# Patient Record
Sex: FEMALE | Race: ASIAN | NOT HISPANIC OR LATINO | URBAN - METROPOLITAN AREA
[De-identification: names, ages, dates, MRNs, and addresses within clinical notes are randomized per-mention and may not be internally consistent; named-entity substitution may affect disease eponyms.]

---

## 2018-05-01 ENCOUNTER — OUTPATIENT (OUTPATIENT)
Dept: OUTPATIENT SERVICES | Facility: HOSPITAL | Age: 23
LOS: 1 days | End: 2018-05-01
Payer: MEDICAID

## 2018-05-01 PROCEDURE — G9001: CPT

## 2018-05-22 ENCOUNTER — EMERGENCY (EMERGENCY)
Facility: HOSPITAL | Age: 23
LOS: 1 days | Discharge: HOME | End: 2018-05-22
Attending: EMERGENCY MEDICINE
Payer: MEDICAID

## 2018-05-22 VITALS
RESPIRATION RATE: 16 BRPM | SYSTOLIC BLOOD PRESSURE: 130 MMHG | TEMPERATURE: 98 F | DIASTOLIC BLOOD PRESSURE: 78 MMHG | WEIGHT: 139.99 LBS | OXYGEN SATURATION: 100 % | HEART RATE: 79 BPM | HEIGHT: 62 IN

## 2018-05-22 LAB
APPEARANCE UR: CLEAR — SIGNIFICANT CHANGE UP
BILIRUB UR-MCNC: NEGATIVE — SIGNIFICANT CHANGE UP
COLOR SPEC: SIGNIFICANT CHANGE UP
DIFF PNL FLD: NEGATIVE — SIGNIFICANT CHANGE UP
GLUCOSE UR QL: NEGATIVE — SIGNIFICANT CHANGE UP
HCG UR QL: NEGATIVE — SIGNIFICANT CHANGE UP
KETONES UR-MCNC: NEGATIVE — SIGNIFICANT CHANGE UP
LEUKOCYTE ESTERASE UR-ACNC: NEGATIVE — SIGNIFICANT CHANGE UP
NITRITE UR-MCNC: NEGATIVE — SIGNIFICANT CHANGE UP
PH UR: 6 — SIGNIFICANT CHANGE UP (ref 5–8)
PROT UR-MCNC: NEGATIVE — SIGNIFICANT CHANGE UP
SP GR SPEC: 1.01 — SIGNIFICANT CHANGE UP (ref 1.01–1.02)
UROBILINOGEN FLD QL: 1

## 2018-05-22 PROCEDURE — 99283 EMERGENCY DEPT VISIT LOW MDM: CPT

## 2018-05-22 PROCEDURE — 81025 URINE PREGNANCY TEST: CPT

## 2018-05-22 PROCEDURE — 81003 URINALYSIS AUTO W/O SCOPE: CPT

## 2018-05-22 RX ORDER — IBUPROFEN 200 MG
600 TABLET ORAL ONCE
Qty: 0 | Refills: 0 | Status: COMPLETED | OUTPATIENT
Start: 2018-05-22 | End: 2018-05-22

## 2018-05-22 RX ORDER — ONDANSETRON 8 MG/1
1 TABLET, FILM COATED ORAL
Qty: 21 | Refills: 0
Start: 2018-05-22

## 2018-05-22 RX ORDER — ONDANSETRON 8 MG/1
4 TABLET, FILM COATED ORAL ONCE
Qty: 0 | Refills: 0 | Status: COMPLETED | OUTPATIENT
Start: 2018-05-22 | End: 2018-05-22

## 2018-05-22 RX ADMIN — Medication 600 MILLIGRAM(S): at 12:44

## 2018-05-22 RX ADMIN — ONDANSETRON 4 MILLIGRAM(S): 8 TABLET, FILM COATED ORAL at 13:10

## 2018-05-22 RX ADMIN — Medication 600 MILLIGRAM(S): at 13:10

## 2018-05-22 NOTE — ED PROVIDER NOTE - PHYSICAL EXAMINATION
Afebrile, hemodynamically stable  NAD, well appearing  Head NCAT  EOMI grossly, anicteric  MMM  RRR, nml S1/S2, no m/r/g  Lungs CTAB, no w/r/r  Abd soft, NT, ND, nml BS, no rebound or guarding  AAO, CN's 3-12 grossly intact  ELISE spontaneously, no leg cyanosis or edema. No CVA tenderness.   Skin warm, well perfused, no rashes or hives

## 2018-05-22 NOTE — ED PROVIDER NOTE - OBJECTIVE STATEMENT
21 y/o F pt with no significant PMHx and no significant PSHx presents to the ED c/o missed period with abd cramping and nausea. Pt relates she has a Carri IUD in place since January 2018. Pt reports an episode of vomiting x5 days ago in the setting of breaking her fast; pt has not been fasting since then. Pt thought she may be pregnant, and went to urgent care, where she was tested negative for pregnancy. Pt states her cramping is located to her lower abd and is similar to her normal period cramps. Pt notes some vaginal discharge that is similar to her "period discharge". Pt took Tylenol for her pain x2 days ago and has not taken anything since. Pt admits she is currently sexually active with her . Pt states she feels tired all the time and came to the ED to find out what is going on as she is unable to go to work 2/2 her symptoms. Pt denies fever, chills, dysuria, vaginal bleeding, diarrhea or any other complaints. NKDA. LBM: today (normal). 21 y/o F pt with no significant PMHx and no significant PSHx presents to the ED c/o missed period with abd cramping and nausea. Pt reports an episode of vomiting x5 days ago in the setting of breaking her fast; pt has not been fasting since then and has tolerated PO well since then. Pt thought she may be pregnant, and went to urgent care, where she was tested negative for pregnancy. Pt states her cramping is located to her b/l lower abd and radiates around to the back and is identical to her usual period cramps. Pt notes some vaginal discharge that is similar to her usual "period discharge." Pt took Tylenol for her pain x2 days ago and has not taken anything since. Pt relates she has a Carri IUD in place since January 2018. Pt admits she is currently sexually active with her . Pt states she feels tired all the time. Pt denies fever, chills, dysuria, vaginal bleeding, diarrhea or any other complaints. NKDA. LBM: today (normal).

## 2018-05-22 NOTE — ED PROVIDER NOTE - MEDICAL DECISION MAKING DETAILS
23 y/o F pt presents with  missed period with pain identical to her usual periods. Character of the pain is low suspicion for torsion. Abd benign and afebrile and character low suspicion for appendicitis or diverticulitis. Pt well appearing, hemodynamically stable. 23 y/o F pt presents with missed period with pain identical to her usual periods. Character of the pain is low suspicion for torsion. Abd benign and afebrile and character low suspicion for appendicitis or diverticulitis. Pt well appearing, hemodynamically stable. 23 y/o F pt presents with missed period with pain identical to her usual periods. Character of the pain is low suspicion for torsion. Abd benign and afebrile and character low suspicion for appendicitis or diverticulitis. Pt well appearing, hemodynamically stable. Given ibuprofen and Zofran. Discharged with need for OB f/u, which she has available, and return precautions.

## 2018-05-24 DIAGNOSIS — R69 ILLNESS, UNSPECIFIED: ICD-10-CM

## 2023-10-10 ENCOUNTER — ASOB RESULT (OUTPATIENT)
Age: 28
End: 2023-10-10

## 2023-10-10 ENCOUNTER — APPOINTMENT (OUTPATIENT)
Dept: ANTEPARTUM | Facility: CLINIC | Age: 28
End: 2023-10-10
Payer: COMMERCIAL

## 2023-10-10 ENCOUNTER — NON-APPOINTMENT (OUTPATIENT)
Age: 28
End: 2023-10-10

## 2023-10-10 PROBLEM — Z00.00 ENCOUNTER FOR PREVENTIVE HEALTH EXAMINATION: Status: ACTIVE | Noted: 2023-10-10

## 2023-10-10 PROCEDURE — 76813 OB US NUCHAL MEAS 1 GEST: CPT | Mod: 59

## 2023-10-10 PROCEDURE — 76801 OB US < 14 WKS SINGLE FETUS: CPT

## 2023-12-05 ENCOUNTER — ASOB RESULT (OUTPATIENT)
Age: 28
End: 2023-12-05

## 2023-12-05 ENCOUNTER — APPOINTMENT (OUTPATIENT)
Dept: ANTEPARTUM | Facility: CLINIC | Age: 28
End: 2023-12-05
Payer: COMMERCIAL

## 2023-12-05 PROCEDURE — 76805 OB US >/= 14 WKS SNGL FETUS: CPT

## 2024-04-19 ENCOUNTER — APPOINTMENT (OUTPATIENT)
Dept: ANTEPARTUM | Facility: CLINIC | Age: 29
End: 2024-04-19

## 2024-04-19 ENCOUNTER — OUTPATIENT (OUTPATIENT)
Dept: INPATIENT UNIT | Facility: HOSPITAL | Age: 29
LOS: 1 days | Discharge: HOME | End: 2024-04-19

## 2024-04-19 VITALS
DIASTOLIC BLOOD PRESSURE: 71 MMHG | SYSTOLIC BLOOD PRESSURE: 110 MMHG | TEMPERATURE: 98 F | HEART RATE: 83 BPM | RESPIRATION RATE: 16 BRPM

## 2024-04-19 VITALS — HEART RATE: 81 BPM | DIASTOLIC BLOOD PRESSURE: 74 MMHG | SYSTOLIC BLOOD PRESSURE: 109 MMHG

## 2024-04-19 DIAGNOSIS — O26.899 OTHER SPECIFIED PREGNANCY RELATED CONDITIONS, UNSPECIFIED TRIMESTER: ICD-10-CM

## 2024-04-19 NOTE — OB PROVIDER TRIAGE NOTE - HISTORY OF PRESENT ILLNESS
29yo  @40w2d presents for rule out labor.   +FM. -VB. -LOF.   EFW: 3700g  GBS: neg    OBHx:   GynHx: Denies hx of fibroids/STDs/abnormal pap smears/ovarian cysts/PCOS   PMHx: Denies  SHx: Denies  Meds: PNVs  All: NKDA

## 2024-04-19 NOTE — OB PROVIDER TRIAGE NOTE - ADDITIONAL INSTRUCTIONS
Call your OBGYN w/ any vaginal bleeding, contractions, leakage of fluid.  Call your OBGYN w/ any decreased fetal movement.

## 2024-04-19 NOTE — OB RN TRIAGE NOTE - FALL HARM RISK - UNIVERSAL INTERVENTIONS
Bed in lowest position, wheels locked, appropriate side rails in place/Call bell, personal items and telephone in reach/Instruct patient to call for assistance before getting out of bed or chair/Non-slip footwear when patient is out of bed/[place] to call system/Physically safe environment - no spills, clutter or unnecessary equipment/Purposeful Proactive Rounding/Room/bathroom lighting operational, light cord in reach

## 2024-04-19 NOTE — OB PROVIDER TRIAGE NOTE - NSOBPROVIDERNOTE_OBGYN_ALL_OB_FT
29yo  @40w2d presents for rule out labor.   - No acute OB intervention at this time  - Pt not in labor, VE with minimal change over 2 hours  - Pt counseled on admission for pain control with option for labor augmentation  - Pt declines at this time, desires expectant mgmt  - NST reviewed, reactive  - No medical contraindication to discharge  - Return precautions reviewed   - Call your OBGYN w/ any vaginal bleeding, contractions, leakage of fluid.  Call your OBGYN w/ any decreased fetal movement.     D/W Dr. Curtis Ibarra, PGY-4

## 2024-04-20 ENCOUNTER — TRANSCRIPTION ENCOUNTER (OUTPATIENT)
Age: 29
End: 2024-04-20

## 2024-04-20 ENCOUNTER — INPATIENT (INPATIENT)
Facility: HOSPITAL | Age: 29
LOS: 3 days | Discharge: HOME | End: 2024-04-24
Attending: OBSTETRICS & GYNECOLOGY | Admitting: OBSTETRICS & GYNECOLOGY

## 2024-04-20 ENCOUNTER — APPOINTMENT (OUTPATIENT)
Dept: ANTEPARTUM | Facility: CLINIC | Age: 29
End: 2024-04-20

## 2024-04-20 VITALS
DIASTOLIC BLOOD PRESSURE: 70 MMHG | TEMPERATURE: 98 F | SYSTOLIC BLOOD PRESSURE: 110 MMHG | HEART RATE: 88 BPM | RESPIRATION RATE: 16 BRPM

## 2024-04-20 DIAGNOSIS — O26.899 OTHER SPECIFIED PREGNANCY RELATED CONDITIONS, UNSPECIFIED TRIMESTER: ICD-10-CM

## 2024-04-20 LAB
BASOPHILS # BLD AUTO: 0.06 K/UL — SIGNIFICANT CHANGE UP (ref 0–0.2)
BASOPHILS NFR BLD AUTO: 0.4 % — SIGNIFICANT CHANGE UP (ref 0–2)
BLD GP AB SCN SERPL QL: NEGATIVE — SIGNIFICANT CHANGE UP
EOSINOPHIL # BLD AUTO: 0.02 K/UL — SIGNIFICANT CHANGE UP (ref 0–0.5)
EOSINOPHIL NFR BLD AUTO: 0.1 % — SIGNIFICANT CHANGE UP (ref 0–6)
HCT VFR BLD CALC: 39.3 % — SIGNIFICANT CHANGE UP (ref 34.5–45)
HGB BLD-MCNC: 13.1 G/DL — SIGNIFICANT CHANGE UP (ref 11.5–15.5)
IANC: 11.71 K/UL — HIGH (ref 1.8–7.4)
IMM GRANULOCYTES NFR BLD AUTO: 0.9 % — SIGNIFICANT CHANGE UP (ref 0–0.9)
LYMPHOCYTES # BLD AUTO: 1.82 K/UL — SIGNIFICANT CHANGE UP (ref 1–3.3)
LYMPHOCYTES # BLD AUTO: 12.4 % — LOW (ref 13–44)
MCHC RBC-ENTMCNC: 31.2 PG — SIGNIFICANT CHANGE UP (ref 27–34)
MCHC RBC-ENTMCNC: 33.3 GM/DL — SIGNIFICANT CHANGE UP (ref 32–36)
MCV RBC AUTO: 93.6 FL — SIGNIFICANT CHANGE UP (ref 80–100)
MONOCYTES # BLD AUTO: 0.96 K/UL — HIGH (ref 0–0.9)
MONOCYTES NFR BLD AUTO: 6.5 % — SIGNIFICANT CHANGE UP (ref 2–14)
NEUTROPHILS # BLD AUTO: 11.71 K/UL — HIGH (ref 1.8–7.4)
NEUTROPHILS NFR BLD AUTO: 79.7 % — HIGH (ref 43–77)
NRBC # BLD: 0 /100 WBCS — SIGNIFICANT CHANGE UP (ref 0–0)
NRBC # FLD: 0 K/UL — SIGNIFICANT CHANGE UP (ref 0–0)
PLATELET # BLD AUTO: 242 K/UL — SIGNIFICANT CHANGE UP (ref 150–400)
RBC # BLD: 4.2 M/UL — SIGNIFICANT CHANGE UP (ref 3.8–5.2)
RBC # FLD: 13.7 % — SIGNIFICANT CHANGE UP (ref 10.3–14.5)
RH IG SCN BLD-IMP: POSITIVE — SIGNIFICANT CHANGE UP
RH IG SCN BLD-IMP: POSITIVE — SIGNIFICANT CHANGE UP
WBC # BLD: 14.7 K/UL — HIGH (ref 3.8–10.5)
WBC # FLD AUTO: 14.7 K/UL — HIGH (ref 3.8–10.5)

## 2024-04-20 RX ORDER — SODIUM CHLORIDE 9 MG/ML
1000 INJECTION, SOLUTION INTRAVENOUS ONCE
Refills: 0 | Status: DISCONTINUED | OUTPATIENT
Start: 2024-04-20 | End: 2024-04-21

## 2024-04-20 RX ORDER — CHLORHEXIDINE GLUCONATE 213 G/1000ML
1 SOLUTION TOPICAL DAILY
Refills: 0 | Status: DISCONTINUED | OUTPATIENT
Start: 2024-04-20 | End: 2024-04-21

## 2024-04-20 RX ORDER — ACETAMINOPHEN 500 MG
975 TABLET ORAL ONCE
Refills: 0 | Status: COMPLETED | OUTPATIENT
Start: 2024-04-20 | End: 2024-04-20

## 2024-04-20 RX ORDER — OXYTOCIN 10 UNIT/ML
333.33 VIAL (ML) INJECTION
Qty: 20 | Refills: 0 | Status: DISCONTINUED | OUTPATIENT
Start: 2024-04-20 | End: 2024-04-22

## 2024-04-20 RX ORDER — OXYTOCIN 10 UNIT/ML
VIAL (ML) INJECTION
Qty: 30 | Refills: 0 | Status: DISCONTINUED | OUTPATIENT
Start: 2024-04-20 | End: 2024-04-21

## 2024-04-20 RX ORDER — INFLUENZA VIRUS VACCINE 15; 15; 15; 15 UG/.5ML; UG/.5ML; UG/.5ML; UG/.5ML
0.5 SUSPENSION INTRAMUSCULAR ONCE
Refills: 0 | Status: DISCONTINUED | OUTPATIENT
Start: 2024-04-20 | End: 2024-04-24

## 2024-04-20 RX ORDER — SODIUM CHLORIDE 9 MG/ML
1000 INJECTION, SOLUTION INTRAVENOUS
Refills: 0 | Status: DISCONTINUED | OUTPATIENT
Start: 2024-04-20 | End: 2024-04-21

## 2024-04-20 RX ADMIN — Medication 975 MILLIGRAM(S): at 21:02

## 2024-04-20 RX ADMIN — Medication 2 MILLIUNIT(S)/MIN: at 19:47

## 2024-04-20 RX ADMIN — Medication 975 MILLIGRAM(S): at 21:22

## 2024-04-20 NOTE — OB PROVIDER TRIAGE NOTE - NSHPPHYSICALEXAM_GEN_ALL_CORE
T(C): 36.5 (04-20-24 @ 12:21), Max: 36.8 (04-19-24 @ 18:29)  HR: 88 (04-20-24 @ 12:37) (81 - 93)  BP: 110/70 (04-20-24 @ 12:37) (103/72 - 120/82)  RR: 16 (04-20-24 @ 12:21) (16 - 16)  SpO2: --  General: Female sitting comfortably btwn ctx, no pain   Head: Normocephalic. Atraumatic.   Eyes: No discharge, lids normal, conjunctiva normal  Lungs: No resp distress  Abdomen: Soft, nontender. Gravid.   TAUS:  Sono saved in ASOB.   NST: Reactive. Category 1.   SSE: No erythema, edema, lesions to external genitalia. Physiologic discharge present. No active, brisk bleeding.  Negative pooling. Negative Nitrazine. Negative Fern.   SVE: No fluid seen. White physiologic discharge on gloves.   Neuro: No facial asymmetry, no slurred speech, moves all 4 extremities  Mood: Alert and lucid, appropriate mood and affect

## 2024-04-20 NOTE — OB RN PATIENT PROFILE - AS SC [facility] MOBILITY
Report given to nurse lehman on [address] [facility] pt transferred per order belongings taken with pt. (4) no limitation

## 2024-04-20 NOTE — OB RN PATIENT PROFILE - FALL HARM RISK - UNIVERSAL INTERVENTIONS
Bed in lowest position, wheels locked, appropriate side rails in place/Call bell, personal items and telephone in reach/Instruct patient to call for assistance before getting out of bed or chair/Non-slip footwear when patient is out of bed/[place] to call system/Physically safe environment - no spills, clutter or unnecessary equipment/Purposeful Proactive Rounding/Room/bathroom lighting operational, light cord in reach

## 2024-04-20 NOTE — OB PROVIDER TRIAGE NOTE - HISTORY OF PRESENT ILLNESS
Ms. YASMANY VEGA is a 28y  @ 40w3d p/w ctx q2-5min, 10/10, desires epidural. + mucous plug / increased wetness. + spotting.   PNC: WHP. Uncx. Pt denies complications with blood pressure and/or blood sugar.

## 2024-04-20 NOTE — OB PROVIDER TRIAGE NOTE - NSOBPROVIDERNOTE_OBGYN_ALL_OB_FT
Ms. YASMANY VEGA is a 28y  @ 40w3d here in latent labor.     Plan  - Admit to Labor and Delivery for active labor for expectant management   - Continuous EFM  - Clear diet, IV fluids  - Consent signed  - Standard labs (T&S, CBC, RPR)  - Epidural PRN, cleared by Dr. Calhoun   - Induction/augmentation agent:   - Consider re-examination in 4 hours     Informed consent was obtained. The following was discussed:  - Induction/augmentation of labor: use of medication and/or cook balloon to begin or enhance labor  - Obstetrical management including internal fetal/contraction monitoring  - Normal vaginal delivery  - Possible  section    Risks, benefits, alternatives, and possible complications have been discussed in detail with the patient in English, patient's preferred language, demonstrating understanding, all questions answered.. Pre-admission, admission, and post admission procedures and expectations were discussed in detail. All questions answered, all appropriate hospital consents were signed. Anticipate normal vaginal delivery.    Evaluation and plan d/w Dr. Calhoun

## 2024-04-20 NOTE — OB PROVIDER H&P - NSLOWPPHRISK_OBGYN_A_OB
No previous uterine incision/[place] Pregnancy/Less than or equal to 4 previous vaginal births/No known bleeding disorder/No history of postpartum hemorrhage/No other PPH risks indicated

## 2024-04-20 NOTE — OB PROVIDER H&P - HISTORY OF PRESENT ILLNESS
Ms. YASMANY VEGA is a 28y  @ 40w3d p/w ctx q2-5min, 10/10, desires epidural. + mucous plug / increased wetness. + spotting. EFW 3900. GBS negative (24).   PNC: WHP. Uncx. Pt denies complications with blood pressure and/or blood sugar.

## 2024-04-20 NOTE — OB RN TRIAGE NOTE - NSICDXPASTMEDICALHX_GEN_ALL_CORE_FT
No [facility] beds per Shawna Shown, Intake       [facility] - Per pablo, no female beds  [facility] - Per Elvi, no female beds  [facility] - they have a bed and will review; 201 and chart faxed PAST MEDICAL HISTORY:  No pertinent past medical history

## 2024-04-21 ENCOUNTER — TRANSCRIPTION ENCOUNTER (OUTPATIENT)
Age: 29
End: 2024-04-21

## 2024-04-21 LAB
APTT BLD: 30.6 SEC — SIGNIFICANT CHANGE UP (ref 24.5–35.6)
BASOPHILS # BLD AUTO: 0.03 K/UL — SIGNIFICANT CHANGE UP (ref 0–0.2)
BASOPHILS NFR BLD AUTO: 0.2 % — SIGNIFICANT CHANGE UP (ref 0–2)
EOSINOPHIL # BLD AUTO: 0 K/UL — SIGNIFICANT CHANGE UP (ref 0–0.5)
EOSINOPHIL NFR BLD AUTO: 0 % — SIGNIFICANT CHANGE UP (ref 0–6)
FIBRINOGEN PPP-MCNC: 519 MG/DL — HIGH (ref 200–465)
HCT VFR BLD CALC: 31 % — LOW (ref 34.5–45)
HGB BLD-MCNC: 10.2 G/DL — LOW (ref 11.5–15.5)
IANC: 11.81 K/UL — HIGH (ref 1.8–7.4)
IMM GRANULOCYTES NFR BLD AUTO: 0.5 % — SIGNIFICANT CHANGE UP (ref 0–0.9)
INR BLD: 1.02 RATIO — SIGNIFICANT CHANGE UP (ref 0.85–1.18)
LYMPHOCYTES # BLD AUTO: 0.89 K/UL — LOW (ref 1–3.3)
LYMPHOCYTES # BLD AUTO: 6.3 % — LOW (ref 13–44)
MCHC RBC-ENTMCNC: 30.4 PG — SIGNIFICANT CHANGE UP (ref 27–34)
MCHC RBC-ENTMCNC: 32.9 GM/DL — SIGNIFICANT CHANGE UP (ref 32–36)
MCV RBC AUTO: 92.5 FL — SIGNIFICANT CHANGE UP (ref 80–100)
MONOCYTES # BLD AUTO: 1.29 K/UL — HIGH (ref 0–0.9)
MONOCYTES NFR BLD AUTO: 9.2 % — SIGNIFICANT CHANGE UP (ref 2–14)
NEUTROPHILS # BLD AUTO: 11.81 K/UL — HIGH (ref 1.8–7.4)
NEUTROPHILS NFR BLD AUTO: 83.8 % — HIGH (ref 43–77)
NRBC # BLD: 0 /100 WBCS — SIGNIFICANT CHANGE UP (ref 0–0)
NRBC # FLD: 0 K/UL — SIGNIFICANT CHANGE UP (ref 0–0)
PLATELET # BLD AUTO: 182 K/UL — SIGNIFICANT CHANGE UP (ref 150–400)
PROTHROM AB SERPL-ACNC: 11.4 SEC — SIGNIFICANT CHANGE UP (ref 9.5–13)
RBC # BLD: 3.35 M/UL — LOW (ref 3.8–5.2)
RBC # FLD: 13.7 % — SIGNIFICANT CHANGE UP (ref 10.3–14.5)
T PALLIDUM AB TITR SER: NEGATIVE — SIGNIFICANT CHANGE UP
WBC # BLD: 14.09 K/UL — HIGH (ref 3.8–10.5)
WBC # FLD AUTO: 14.09 K/UL — HIGH (ref 3.8–10.5)

## 2024-04-21 RX ORDER — OXYCODONE HYDROCHLORIDE 5 MG/1
10 TABLET ORAL
Refills: 0 | Status: DISCONTINUED | OUTPATIENT
Start: 2024-04-21 | End: 2024-04-22

## 2024-04-21 RX ORDER — KETOROLAC TROMETHAMINE 30 MG/ML
30 SYRINGE (ML) INJECTION EVERY 6 HOURS
Refills: 0 | Status: COMPLETED | OUTPATIENT
Start: 2024-04-21 | End: 2024-04-22

## 2024-04-21 RX ORDER — OXYCODONE HYDROCHLORIDE 5 MG/1
5 TABLET ORAL ONCE
Refills: 0 | Status: DISCONTINUED | OUTPATIENT
Start: 2024-04-21 | End: 2024-04-24

## 2024-04-21 RX ORDER — TETANUS TOXOID, REDUCED DIPHTHERIA TOXOID AND ACELLULAR PERTUSSIS VACCINE, ADSORBED 5; 2.5; 8; 8; 2.5 [IU]/.5ML; [IU]/.5ML; UG/.5ML; UG/.5ML; UG/.5ML
0.5 SUSPENSION INTRAMUSCULAR ONCE
Refills: 0 | Status: DISCONTINUED | OUTPATIENT
Start: 2024-04-21 | End: 2024-04-24

## 2024-04-21 RX ORDER — ACETAMINOPHEN 500 MG
975 TABLET ORAL
Refills: 0 | Status: DISCONTINUED | OUTPATIENT
Start: 2024-04-21 | End: 2024-04-24

## 2024-04-21 RX ORDER — LANOLIN
1 OINTMENT (GRAM) TOPICAL EVERY 6 HOURS
Refills: 0 | Status: DISCONTINUED | OUTPATIENT
Start: 2024-04-21 | End: 2024-04-24

## 2024-04-21 RX ORDER — OXYTOCIN 10 UNIT/ML
333.33 VIAL (ML) INJECTION
Qty: 20 | Refills: 0 | Status: DISCONTINUED | OUTPATIENT
Start: 2024-04-21 | End: 2024-04-22

## 2024-04-21 RX ORDER — IBUPROFEN 200 MG
600 TABLET ORAL EVERY 6 HOURS
Refills: 0 | Status: COMPLETED | OUTPATIENT
Start: 2024-04-21 | End: 2025-03-20

## 2024-04-21 RX ORDER — SIMETHICONE 80 MG/1
80 TABLET, CHEWABLE ORAL EVERY 4 HOURS
Refills: 0 | Status: DISCONTINUED | OUTPATIENT
Start: 2024-04-21 | End: 2024-04-24

## 2024-04-21 RX ORDER — HEPARIN SODIUM 5000 [USP'U]/ML
5000 INJECTION INTRAVENOUS; SUBCUTANEOUS EVERY 12 HOURS
Refills: 0 | Status: DISCONTINUED | OUTPATIENT
Start: 2024-04-21 | End: 2024-04-24

## 2024-04-21 RX ORDER — SODIUM CHLORIDE 9 MG/ML
1000 INJECTION, SOLUTION INTRAVENOUS
Refills: 0 | Status: DISCONTINUED | OUTPATIENT
Start: 2024-04-21 | End: 2024-04-22

## 2024-04-21 RX ORDER — MAGNESIUM HYDROXIDE 400 MG/1
30 TABLET, CHEWABLE ORAL
Refills: 0 | Status: DISCONTINUED | OUTPATIENT
Start: 2024-04-21 | End: 2024-04-24

## 2024-04-21 RX ORDER — OXYCODONE HYDROCHLORIDE 5 MG/1
5 TABLET ORAL
Refills: 0 | Status: COMPLETED | OUTPATIENT
Start: 2024-04-21 | End: 2024-04-28

## 2024-04-21 RX ORDER — ONDANSETRON 8 MG/1
4 TABLET, FILM COATED ORAL ONCE
Refills: 0 | Status: COMPLETED | OUTPATIENT
Start: 2024-04-21 | End: 2024-04-21

## 2024-04-21 RX ORDER — DIPHENHYDRAMINE HCL 50 MG
25 CAPSULE ORAL EVERY 6 HOURS
Refills: 0 | Status: DISCONTINUED | OUTPATIENT
Start: 2024-04-21 | End: 2024-04-24

## 2024-04-21 RX ADMIN — Medication 975 MILLIGRAM(S): at 22:07

## 2024-04-21 RX ADMIN — ONDANSETRON 4 MILLIGRAM(S): 8 TABLET, FILM COATED ORAL at 06:10

## 2024-04-21 RX ADMIN — Medication 975 MILLIGRAM(S): at 21:15

## 2024-04-21 RX ADMIN — OXYCODONE HYDROCHLORIDE 10 MILLIGRAM(S): 5 TABLET ORAL at 18:24

## 2024-04-21 RX ADMIN — OXYCODONE HYDROCHLORIDE 10 MILLIGRAM(S): 5 TABLET ORAL at 17:19

## 2024-04-21 NOTE — OB PROVIDER LABOR PROGRESS NOTE - NS_SUBJECTIVE/OBJECTIVE_OBGYN_ALL_OB_FT
04-20-24 @ 19:33  Patient was evaluated at bedside.  Her cervix was checked and found to be 4.5/90/-3
04-21-24 @ 01:50  Patient was evaluated at bedside.  Her cervix was checked and found to be 6/100/-3  SROM@130a - mec
Patient checked for tachysystole
Pt seen & examined at bedside for labor progress & AROM. Resting comfortably with epidural.     Vital Signs Last 24 Hrs  T(C): 36.9 (20 Apr 2024 16:34), Max: 36.9 (20 Apr 2024 14:41)  T(F): 98.42 (20 Apr 2024 16:34), Max: 98.42 (20 Apr 2024 14:41)  HR: 76 (20 Apr 2024 17:02) (71 - 112)  BP: 108/65 (20 Apr 2024 16:48) (95/67 - 120/82)  BP(mean): --  RR: 14 (20 Apr 2024 16:34) (14 - 18)  SpO2: 93% (20 Apr 2024 17:03) (93% - 100%)        FHR  140, moderate variability, +accels, -decels, cat 1   TOCO: Q2-3min  VE: 4-5/90/-5, BBOW  vtx not palpable   BSUS: presentation vtx

## 2024-04-21 NOTE — DISCHARGE NOTE OB - CARE PROVIDER_API CALL
Caron Acevedo  Obstetrics and Gynecology  [address] [place] [id number]  Phone: [phone]  Fax: [phone]  Follow Up Time:

## 2024-04-21 NOTE — OB PROVIDER DELIVERY SUMMARY - NSSELHIDDEN_OBGYN_ALL_OB_FT
[NS_DeliveryAttending1_OBGYN_ALL_OB_FT:[id number]],[NS_DeliveryRN_OBGYN_ALL_OB_FT:[id number]]

## 2024-04-21 NOTE — DISCHARGE NOTE OB - CARE PROVIDERS DIRECT ADDRESSES
[email or web address]

## 2024-04-21 NOTE — PROVIDER CONTACT NOTE (OTHER) - ASSESSMENT
Patient is A&Ox4, pain lvl 1/10, /81 P 98 SpO2 98%, RR 22. Pt feels warm to touch and found to have 4 heavy blankets on from OR.

## 2024-04-21 NOTE — PROVIDER CONTACT NOTE (OTHER) - ACTION/TREATMENT ORDERED:
Blankets removed from patient and light sheet placed over her instead.  Patient to have rectal temperature obtained

## 2024-04-21 NOTE — OB PROVIDER LABOR PROGRESS NOTE - ASSESSMENT
29y/o  at 40+3wks in labor without cervical change. Vtx not palpable on vaginal exam so AROM deferred.    Plan  -expectant management  -if ctx space, can consider starting pitocin  -possible AROM next VE  -continue EFM/TOCO/IV fluids  -repositioning    Discussed w/ Dr Unique Edwards CNM
cat I  SROM@130a, [facility]  c/w pit    PLAN:  Continuous FHT/[place]  Maternal/fetal status reassuring  Will continue to reassess prn.    d/w Dr. Unique Perez PGY1
Labor  - Pitocin at 6  - Contractions beginning to space out    Cheryl Flores, PGY2  d/w Dr. Calhoun
cat I, will start pit    PLAN:  Continuous FHT/[place]  Maternal/fetal status reassuring  Will continue to reassess prn.    D/w Dr. Unique Perez PGY1

## 2024-04-21 NOTE — CHART NOTE - NSCHARTNOTEFT_GEN_A_CORE
Patient seen at bedside. After discussing with partner, patient agreeing for  delivery. Consents signed. Anesthesia and nursing aware.
Patient seen at bedside. Pushing with contractions. Head still at +1 station. Tracing reviewed - Cat 1. Pitocin at 6mu. Continue pushing with nurse.
Late entry    Patient seen and examined at bedside. VE: 10/100/+1. Has been laboring down for about 1 hour. Still does not feel much pressure/urge to push with epidural. Directed to push with contractions. Tracing Cat 1. Pitocin at 4mu. Will continue pushing with nursing.
Patient seen at bedside, pushing with nursing. Head at +1 station. Tracing Cat 1. Good maternal effort.     PTA held with charge nurse Brenda, safety office Dr. Mccoy, PHY4 Dr. Morton and patient's nurse Mandi Ortega. Fully dilated 3 hours, pushing for 2 hours. Suspected large baby. Will continue pushing as tracing is cat 1. Reassess in 30 minutes.
Patient seen at bedside. Pushing with contractions. Head at +1 station still. Fully dilated for just about 4 hours, pushing for 3 hours. Discussed with patient recommendation for  delivery for arrest of descent given no change in fetal station after 3 hours of pushing. Discussed risks of prolonged stage 2 including risk of hemorrhage. Reviewed possible risk of  delivery including risk of bleeding, infection, injury to surrounding organs, DVT/PE, poor wound healing. hysterectomy, death. Patient understands, requesting time to speak with  and decide. Tracing Cat 1.

## 2024-04-21 NOTE — DISCHARGE NOTE OB - MEDICATION SUMMARY - MEDICATIONS TO TAKE
I will START or STAY ON the medications listed below when I get home from the hospital:    ibuprofen 600 mg oral tablet  -- 1 tab(s) by mouth every 8 hours as needed for  moderate pain  -- Indication: For Pain     acetaminophen 325 mg oral tablet  -- 3 tab(s) by mouth every 8 hours as needed for  moderate pain  -- Indication: For Pain     ferrous sulfate 325 mg (65 mg elemental iron) oral tablet  -- 1 tab(s) by mouth 2 times a day  -- Indication: For Vitamin     ascorbic acid 500 mg oral tablet  -- 1 tab(s) by mouth once a day  -- Indication: For Vitamin

## 2024-04-21 NOTE — PROVIDER CONTACT NOTE (OTHER) - BACKGROUND
Pt is  @ 40.4 wks s/p prim c/s for arrest of descent. No med/sx hx, uncomplicated pregnancy. SROM  @ 0130. pt rcvd rectal cytotec 1000mcg, methergine, and hemabate in OR`

## 2024-04-21 NOTE — OB PROVIDER LABOR PROGRESS NOTE - NS_OBIHIFHRDETAILS_OBGYN_ALL_OB_FT
4.5/90/-3
baseline 140, moderate variability, + accelerations, - decelerations
140bpm, mod variability, +accels, -decels, cat I

## 2024-04-21 NOTE — DISCHARGE NOTE OB - BREASTFEEDING PROVIDES STABLE TEMPERATURE THROUGH SKIN TO SKIN CONTACT
[facility]  Anesthesia Pre-Operative Evaluation         Patient Name/: Anna Alas, 1959  MRN: [id number]    SUBJECTIVE:     Pre-operative evaluation for Procedure(s) (LRB):  PARATHYROIDECTOMY (N/A)     2023    Anna Alas is a 64 y.o. female w/ a significant PMHx of HTN, lung mass, GERD w/ esophagitis, and primary hyperparathyroidism.    Patient now presents for the above procedure(s).    ________________________________________  No results found for this or any previous visit.    ________________________________________    Prev airway:     Present Prior to Hospital Arrival?: No; Placement Date: 21; Placement Time: 1005; Method of Intubation: Hernandez; Inserted by: CRNA; Airway Device: Endotracheal Tube; Mask Ventilation: Easy; Intubated: Postinduction; Blade: Hernandez #3; Airway Device Size: 7.5; , ETT Condensation; Grade: Grade I; Complicating Factors: None; Intubation Findings: Positive EtCO2, Bilateral breath sounds, Atraumatic/Condition of teeth unchanged;  Depth of Insertion: 21; Securment: Lips; Complications: None; Breath Sounds: Equal Bilateral; Insertion Attempts: 1; Removal Date: 21;  Removal Time: 1057    LDA: None documented.       Drips: None documented.      Patient Active Problem List   Diagnosis    Pituitary tumor    Lung mass    Encounter for long-term (current) use of medications    Moderate hypertension    Attention deficit hyperactivity disorder (ADHD), predominantly inattentive type    Herpes zoster without complication    Abnormally low high density lipoprotein (HDL) cholesterol with hypertriglyceridemia    Screen for colon cancer    Cystocele with prolapse    Gastroesophageal reflux disease with esophagitis without hemorrhage    History of kidney stones    Primary hyperparathyroidism       Review of patient's allergies indicates:   Allergen Reactions    Fish containing products Hives     Catfish      Lisinopril      Cough     Shellfish containing products Hives     Just Shrimp (food)    Benadryl [diphenhydramine hcl] Other (See Comments)     Other reaction(s): Itching, pt can use up to 25mg without itching       Current Inpatient Medications:       No current facility-administered medications on file prior to encounter.     Current Outpatient Medications on File Prior to Encounter   Medication Sig Dispense Refill    bromocriptine (PARLODEL) 2.5 mg Tab Tab 1 po M/W/F, tabs 2 po S/S/T/T. (Patient taking differently: 5 mg once daily.) 135 tablet 4    cetirizine (ZYRTEC) 10 MG tablet Take 10 mg by mouth daily as needed. 1 Tablet Oral Every day.  as directed      coenzyme Q10 (CO Q-10) 100 mg capsule Take 400 mg by mouth once daily.       dicyclomine (BENTYL) 20 mg tablet Take 20 mg by mouth 4 (four) times daily as needed.      losartan (COZAAR) 50 MG tablet Take 1 tablet (50 mg total) by mouth once daily. 90 tablet 4    omega-3 fatty acids 1,000 mg Cap Take 2 g by mouth once daily.       pantoprazole (PROTONIX) 40 MG tablet Take 1 tablet (40 mg total) by mouth once daily. (Patient taking differently: Take 40 mg by mouth 2 (two) times daily.) 90 tablet 4    pravastatin (PRAVACHOL) 40 MG tablet Take 1 tablet (40 mg total) by mouth every evening. 90 tablet 4       Past Surgical History:   Procedure Laterality Date    ANTERIOR VAGINAL REPAIR N/A 06/04/2021    Procedure: COLPORRHAPHY, ANTERIOR;  Surgeon: Robb Dooley MD;  Location: [facility] OR;  Service: Urology;  Laterality: N/A;    BREAST SURGERY      right breast abscess    COLONOSCOPY W/ POLYPECTOMY  07/06/2023    Dr Patrick    COSMETIC SURGERY      Breast Augmentation    ESOPHAGOGASTRODUODENOSCOPY N/A 09/22/2020    Procedure: EGD (ESOPHAGOGASTRODUODENOSCOPY);  Surgeon: Pillo Lopez Jr., MD;  Location: [facility];  Service: Endoscopy;  Laterality: N/A;    EXTRACORPOREAL SHOCK WAVE LITHOTRIPSY      x 2    INSERTION OF MIDURETHRAL SLING N/A 06/04/2021    Procedure:  SLING, MIDURETHRAL;  Surgeon: Robb Dooley MD;  [facility];  Service: Urology;  Laterality: N/A;    LITHOTRIPSY      twice in 2012, with stent , Once in 3/2015, other procedures at other facility    TUBAL LIGATION      VAGINAL DELIVERY      times 2       Social History:  Tobacco Use: Low Risk  (9/11/2023)    Patient History     Smoking Tobacco Use: Never     Smokeless Tobacco Use: Never     Passive Exposure: Not on file       Alcohol Use: Not At Risk (4/22/2023)    AUDIT-C     Frequency of Alcohol Consumption: Never     Average Number of Drinks: Patient does not drink     Frequency of Binge Drinking: Never       OBJECTIVE:     Vital Signs Range:  BMI Readings from Last 1 Encounters:   09/11/23 27.70 kg/m²               Significant Labs:        Component Value Date/Time    WBC 11.49 03/24/2023 0937    HGB 11.9 (L) 03/24/2023 0937    HCT 37.5 03/24/2023 0937     (H) 03/24/2023 0937     08/25/2023 1112    K 4.1 08/25/2023 1112     08/25/2023 1112    CO2 23 08/25/2023 1112    GLU 97 08/25/2023 1112    BUN 27 (H) 08/25/2023 1112    CREATININE 0.8 08/25/2023 1112    PHOS 2.7 08/25/2023 1112    CALCIUM 10.3 08/25/2023 1112    ALBUMIN 3.7 08/25/2023 1112    PROT 7.1 03/24/2023 0937    ALKPHOS 119 03/24/2023 0937    BILITOT 0.6 03/24/2023 0937    AST 25 03/24/2023 0937    ALT 18 03/24/2023 0937    INR 0.9 05/31/2021 1413        Please see Results Review for additional labs.     Diagnostic Studies: No relevant studies.    EKG:   Results for orders placed or performed during the hospital encounter of 05/31/21   EKG 12-lead    Collection Time: 05/31/21  1:49 PM    Narrative    Test Reason : N81.0,    Vent. Rate : 078 BPM     Atrial Rate : 078 BPM     P-R Int : 182 ms          QRS Dur : 082 ms      QT Int : 384 ms       P-R-T Axes : 064 068 075 degrees     QTc Int : 437 ms    Normal sinus rhythm  Normal ECG  When compared with ECG of 22-AUG-2020 09:04,  No significant change was  found  Confirmed by Gomez HARPER MD, Evan MIR ([id number]) on 6/3/2021 6:28:55 AM    Referred By: ROMARIO DENSON           Confirmed By:Evan Dyer III, MD       ECHO:  See subjective, if available.      ASSESSMENT/PLAN:           Pre-op Assessment    I have reviewed the Patient Summary Reports.    I have reviewed the NPO Status.   I have reviewed the Medications.     Review of Systems  Anesthesia Hx:  No problems with previous Anesthesia  History of prior surgery of interest to airway management or planning: Previous anesthesia: General Denies Family Hx of Anesthesia complications.   Denies Personal Hx of Anesthesia complications.   Cardiovascular:   Hypertension    Renal/:   Chronic Renal Disease    Psych:   Psychiatric History          Physical Exam  General: Well nourished, Cooperative, Alert and Oriented    Airway:  Mallampati: I   Mouth Opening: Normal  TM Distance: Normal  Tongue: Normal  Neck ROM: Normal ROM    Dental:  Intact    Chest/Lungs:  Clear to auscultation, Normal Respiratory Rate    Heart:  Rate: Normal  Rhythm: Regular Rhythm  Sounds: Normal        Anesthesia Plan  Type of Anesthesia, risks & benefits discussed:    Anesthesia Type: Gen ETT  Intra-op Monitoring Plan: Standard ASA Monitors and Art Line  Post Op Pain Control Plan: multimodal analgesia and IV/PO Opioids PRN  Induction:  IV  Airway Plan: Direct and Video, Post-Induction  Informed Consent: Informed consent signed with the Patient and all parties understand the risks and agree with anesthesia plan.  All questions answered.   ASA Score: 3  Day of Surgery Review of History & Physical: H&P Update referred to the surgeon/provider.I have interviewed and examined the patient. I have reviewed the patient's H&P dated:     Ready For Surgery From Anesthesia Perspective.     .       Statement Selected

## 2024-04-21 NOTE — OB NEONATOLOGY/PEDIATRICIAN DELIVERY SUMMARY - NS_GESTAGEATBIRTHA_OBGYN_ALL_OB_FT
Randy Sanchez (:  1951) is a 73 y.o. male,Established patient, here for evaluation of the following chief complaint(s):  Follow-up         ASSESSMENT/PLAN:  1. Essential hypertension  -     TSH; Future  -     CBC with Auto Differential; Future  -     Comprehensive Metabolic Panel; Future  -     chlorthalidone (HYGROTON) 25 MG tablet; Take 1 tablet by mouth daily, Disp-90 tablet, R-3Normal  2. Stage 3a chronic kidney disease (HCC)  3. Paroxysmal atrial fibrillation (HCC)  -     apixaban (ELIQUIS) 5 MG TABS tablet; TAKE 1 TABLET TWICE A DAY, Disp-180 tablet, R-3Normal  4. Prostate cancer (HCC)  -     PSA, Prostatic Specific Antigen; Future  5. B12 deficiency  -     Vitamin B12 & Folate; Future  6. Mixed hyperlipidemia  -     Lipid Panel; Future  7. Iron deficiency anemia secondary to inadequate dietary iron intake  8. Colon cancer screening  -     [facility] Calvin Nicole MD, Gastroenterology, [facility]  9. Mixed stress and urge urinary incontinence  -     atorvastatin (LIPITOR) 20 MG tablet; Take 1 tablet by mouth nightly, Disp-90 tablet, R-3Normal  -     tolterodine (DETROL LA) 4 MG extended release capsule; TAKE 1 CAPSULE DAILY, Disp-90 capsule, R-3Normal  10. Hypertension goal BP (blood pressure) < 130/80  -     dilTIAZem (TIAZAC) 240 MG extended release capsule; Take 1 capsule by mouth daily, Disp-90 capsule, R-3Normal  Overall patient seems to be doing very well he is trying to stay more active blood pressure today is well-controlled we can continue the same plan of care    The patient is having his blood testing repeated today and will decide if any adjustment to his treatment is needed refills have been given as required    Patient needs to have a colonoscopy and referral has been initiated    Return in about 6 months (around 10/1/2024).         Subjective   SUBJECTIVE/OBJECTIVE:    Lab Review   Lab Results   Component Value Date/Time     2023 10:02 AM    LAMBERTO 142 2023 
40w4d

## 2024-04-21 NOTE — OB RN DELIVERY SUMMARY - NS_SEPSISRSKCALC_OBGYN_ALL_OB_FT
EOS calculated successfully. EOS Risk Factor: 0.5/1000 live births ([facility] national incidence); GA=40w4d; Temp=98.6; ROM=11.35; GBS='Negative'; Antibiotics='No antibiotics or any antibiotics < 2 hrs prior to birth'

## 2024-04-21 NOTE — DISCHARGE NOTE OB - HOSPITAL COURSE
Admitted in labor, PCS for arrest of descent  Admitted in labor, PCS for arrest of descent, PPH (6144)

## 2024-04-21 NOTE — OB NEONATOLOGY/PEDIATRICIAN DELIVERY SUMMARY - NSPEDSNEONOTESA_OBGYN_ALL_OB_FT
Peds called to OR for meconium for a 40.3wk LGA female born via CS to a 31y/o  mother.  Maternal No significant maternal or prenatal history. Maternal labs include Blood Type O+, HIV - , RPR NR , Rubella I , Hep B - , GBS - 3/27. SROM at 0143 on  with meconium fluids (ROM hours: 11.5H).  Baby emerged vigorous, crying, was w/d/s/s with APGARS of 8/9 . Mom plans to initiate breastfeeding / formula feed, consents Hep B vaccine circ.  Highest maternal temp: 37. EOS 0.16.    : 24  TOB: 1304  Weight: 3950

## 2024-04-21 NOTE — OB RN DELIVERY SUMMARY - NSSELHIDDEN_OBGYN_ALL_OB_FT
[NS_DeliveryAttending1_OBGYN_ALL_OB_FT:[id number]],[NS_DeliveryRN_OBGYN_ALL_OB_FT:[id number]=]

## 2024-04-21 NOTE — DISCHARGE NOTE OB - PATIENT PORTAL LINK FT
You can access the FollowMyHealth Patient Portal offered by [facility] by registering at the following website: [email or web address]. By joining [facility]’s FollowMyHealth portal, you will also be able to view your health information using other applications (apps) compatible with our system.

## 2024-04-21 NOTE — OB PROVIDER DELIVERY SUMMARY - NSPROVIDERDELIVERYNOTE_OBGYN_ALL_OB_FT
Patient fully dilated and pushing for 2-3 hours with no descent of fetal head. Decision made to proceed with primary LTCS for arrest of descent, complicated by atony. Viable female infant, vertex presentation, Apgars 8/9, cord gasses sent. Grossly normal fallopian tubes, uterus, and ovaries. Patient received Methergine IM, Hemabate IM, 1000mcg Cytotec per rectum with improvement in uterine tone. Uterus closed in 1 layer with Vicryl suture. Small 1-2cm left sided extension repaired in standard fashion with Vicryl suture. Surgicel powder placed over hysterotomy with excellent hemostasis noted. Fascia closed with 0-Vicryl suture. Subcutaneous tissue reapproximated with 2-0 plain gut suture. Skin closed with Vicryl. QBL prior to weighing chux underneath patient was 975cc. Final QBL after weighing chux 1324cc.    QBL: 1324   IVF: 1000  UOP: 300    Plan for 4hr postop CBC, Coags. No Methergine PO series at this time. Plan communicated with primary MISAEL Ortega.    Dictation #    Jessy Fish, PGY 2  w/ attending, Dr. Acevedo Patient fully dilated for 4 hours and pushing for 3 hours with no descent of fetal head. Decision made to proceed with primary LTCS for arrest of descent, complicated by atony. Viable female infant, vertex presentation, OP position, Apgars 8/9, cord gasses sent. Grossly normal fallopian tubes, uterus, and ovaries. Patient received Methergine IM, Hemabate IM, 1000mcg Cytotec per rectum with improvement in uterine tone. Uterus closed in 1 layer with Vicryl suture. Small 1-2cm left sided extension repaired in standard fashion with Vicryl suture. Surgicel powder placed over hysterotomy with excellent hemostasis noted. Fascia closed with 0-Vicryl suture. Subcutaneous tissue reapproximated with 2-0 plain gut suture. Skin closed with Vicryl. QBL prior to weighing chux underneath patient was 975cc. Final QBL after weighing chux 1324cc.    QBL: 1324   IVF: 1000  UOP: 300    Plan for 4hr postop CBC, Coags. No Methergine PO series at this time. Plan communicated with primary MISAEL Ortega.    Dictation #    Jessy Fish, PGY 2  w/ attending, Dr. Acevedo Patient fully dilated for 4 hours and pushing for 3 hours with no descent of fetal head. Decision made to proceed with primary LTCS for arrest of descent, complicated by atony. Viable female infant, vertex presentation, OP position, Apgars 8/9, cord gasses sent. Grossly normal fallopian tubes, uterus, and ovaries. Patient received Methergine IM, Hemabate IM, 1000mcg Cytotec per rectum with improvement in uterine tone. Uterus closed in 1 layer with Vicryl suture. Small 1-2cm left sided extension repaired in standard fashion with Vicryl suture. Surgicel powder placed over hysterotomy with excellent hemostasis noted. Fascia closed with 0-Vicryl suture. Subcutaneous tissue reapproximated with 2-0 plain gut suture. Skin closed with Vicryl. QBL prior to weighing chux underneath patient was 975cc. Final QBL after weighing chux 1324cc.    QBL: 1324   IVF: 1000  UOP: 300    Plan for 4hr postop CBC, Coags. No Methergine PO series at this time. Plan communicated with primary MISAEL Ortega.    Dictation #[id number]    Jessy Fish, PGY 2  w/ attending, Dr. Acevedo

## 2024-04-21 NOTE — OB RN INTRAOPERATIVE NOTE - NSSELHIDDEN_OBGYN_ALL_OB_FT
[NS_DeliveryAttending1_OBGYN_ALL_OB_FT:[id number]],[NS_DeliveryRN_OBGYN_ALL_OB_FT:[id number]=]

## 2024-04-22 DIAGNOSIS — Z3A.40 40 WEEKS GESTATION OF PREGNANCY: ICD-10-CM

## 2024-04-22 DIAGNOSIS — O47.1 FALSE LABOR AT OR AFTER 37 COMPLETED WEEKS OF GESTATION: ICD-10-CM

## 2024-04-22 DIAGNOSIS — O48.0 POST-TERM PREGNANCY: ICD-10-CM

## 2024-04-22 LAB
BASOPHILS # BLD AUTO: 0.03 K/UL — SIGNIFICANT CHANGE UP (ref 0–0.2)
BASOPHILS NFR BLD AUTO: 0.2 % — SIGNIFICANT CHANGE UP (ref 0–2)
EOSINOPHIL # BLD AUTO: 0.03 K/UL — SIGNIFICANT CHANGE UP (ref 0–0.5)
EOSINOPHIL NFR BLD AUTO: 0.2 % — SIGNIFICANT CHANGE UP (ref 0–6)
HCT VFR BLD CALC: 28.4 % — LOW (ref 34.5–45)
HGB BLD-MCNC: 9.3 G/DL — LOW (ref 11.5–15.5)
IANC: 11.18 K/UL — HIGH (ref 1.8–7.4)
IMM GRANULOCYTES NFR BLD AUTO: 0.5 % — SIGNIFICANT CHANGE UP (ref 0–0.9)
LYMPHOCYTES # BLD AUTO: 13.8 % — SIGNIFICANT CHANGE UP (ref 13–44)
LYMPHOCYTES # BLD AUTO: 2.04 K/UL — SIGNIFICANT CHANGE UP (ref 1–3.3)
MCHC RBC-ENTMCNC: 31.3 PG — SIGNIFICANT CHANGE UP (ref 27–34)
MCHC RBC-ENTMCNC: 32.7 GM/DL — SIGNIFICANT CHANGE UP (ref 32–36)
MCV RBC AUTO: 95.6 FL — SIGNIFICANT CHANGE UP (ref 80–100)
MONOCYTES # BLD AUTO: 1.41 K/UL — HIGH (ref 0–0.9)
MONOCYTES NFR BLD AUTO: 9.6 % — SIGNIFICANT CHANGE UP (ref 2–14)
NEUTROPHILS # BLD AUTO: 11.18 K/UL — HIGH (ref 1.8–7.4)
NEUTROPHILS NFR BLD AUTO: 75.7 % — SIGNIFICANT CHANGE UP (ref 43–77)
NRBC # BLD: 0 /100 WBCS — SIGNIFICANT CHANGE UP (ref 0–0)
NRBC # FLD: 0 K/UL — SIGNIFICANT CHANGE UP (ref 0–0)
PLATELET # BLD AUTO: 203 K/UL — SIGNIFICANT CHANGE UP (ref 150–400)
RBC # BLD: 2.97 M/UL — LOW (ref 3.8–5.2)
RBC # FLD: 13.7 % — SIGNIFICANT CHANGE UP (ref 10.3–14.5)
WBC # BLD: 14.76 K/UL — HIGH (ref 3.8–10.5)
WBC # FLD AUTO: 14.76 K/UL — HIGH (ref 3.8–10.5)

## 2024-04-22 RX ORDER — ASCORBIC ACID 60 MG
500 TABLET,CHEWABLE ORAL DAILY
Refills: 0 | Status: DISCONTINUED | OUTPATIENT
Start: 2024-04-22 | End: 2024-04-24

## 2024-04-22 RX ORDER — FERROUS SULFATE 325(65) MG
325 TABLET ORAL
Refills: 0 | Status: DISCONTINUED | OUTPATIENT
Start: 2024-04-22 | End: 2024-04-24

## 2024-04-22 RX ORDER — OXYCODONE HYDROCHLORIDE 5 MG/1
5 TABLET ORAL ONCE
Refills: 0 | Status: DISCONTINUED | OUTPATIENT
Start: 2024-04-22 | End: 2024-04-24

## 2024-04-22 RX ORDER — OXYCODONE HYDROCHLORIDE 5 MG/1
5 TABLET ORAL
Refills: 0 | Status: DISCONTINUED | OUTPATIENT
Start: 2024-04-22 | End: 2024-04-24

## 2024-04-22 RX ORDER — IBUPROFEN 200 MG
600 TABLET ORAL EVERY 6 HOURS
Refills: 0 | Status: DISCONTINUED | OUTPATIENT
Start: 2024-04-22 | End: 2024-04-24

## 2024-04-22 RX ADMIN — Medication 600 MILLIGRAM(S): at 09:30

## 2024-04-22 RX ADMIN — Medication 975 MILLIGRAM(S): at 21:52

## 2024-04-22 RX ADMIN — Medication 600 MILLIGRAM(S): at 18:30

## 2024-04-22 RX ADMIN — OXYCODONE HYDROCHLORIDE 5 MILLIGRAM(S): 5 TABLET ORAL at 14:21

## 2024-04-22 RX ADMIN — Medication 975 MILLIGRAM(S): at 04:50

## 2024-04-22 RX ADMIN — SIMETHICONE 80 MILLIGRAM(S): 80 TABLET, CHEWABLE ORAL at 13:53

## 2024-04-22 RX ADMIN — HEPARIN SODIUM 5000 UNIT(S): 5000 INJECTION INTRAVENOUS; SUBCUTANEOUS at 01:22

## 2024-04-22 RX ADMIN — MAGNESIUM HYDROXIDE 30 MILLILITER(S): 400 TABLET, CHEWABLE ORAL at 13:54

## 2024-04-22 RX ADMIN — OXYCODONE HYDROCHLORIDE 5 MILLIGRAM(S): 5 TABLET ORAL at 13:26

## 2024-04-22 RX ADMIN — Medication 600 MILLIGRAM(S): at 17:37

## 2024-04-22 RX ADMIN — Medication 600 MILLIGRAM(S): at 08:37

## 2024-04-22 RX ADMIN — Medication 975 MILLIGRAM(S): at 21:02

## 2024-04-22 RX ADMIN — HEPARIN SODIUM 5000 UNIT(S): 5000 INJECTION INTRAVENOUS; SUBCUTANEOUS at 13:08

## 2024-04-22 RX ADMIN — Medication 30 MILLIGRAM(S): at 01:16

## 2024-04-22 RX ADMIN — Medication 975 MILLIGRAM(S): at 05:34

## 2024-04-22 RX ADMIN — Medication 30 MILLIGRAM(S): at 02:01

## 2024-04-22 NOTE — LACTATION INITIAL EVALUATION - LACTATION INTERVENTIONS
initiate/review safe skin-to-skin/initiate/review hand expression/initiate/review techniques for position and latch/review techniques to increase milk supply/initiate/review breast massage/compression/reviewed components of an effective feeding and at least 8 effective feedings per day required/reviewed importance of monitoring infant diapers, the breastfeeding log, and minimum output each day/reviewed risks of unnecessary formula supplementation/reviewed strategies to transition to breastfeeding only/reviewed benefits and recommendations for rooming in/reviewed feeding on demand/by cue at least 8 times a day/reviewed indications of inadequate milk transfer that would require supplementation

## 2024-04-22 NOTE — LACTATION INITIAL EVALUATION - POLY CYSTIC OVARIAN SYNDROME
Siliq Counseling:  I discussed with the patient the risks of Siliq including but not limited to new or worsening depression, suicidal thoughts and behavior, immunosuppression, malignancy, posterior leukoencephalopathy syndrome, and serious infections.  The patient understands that monitoring is required including a PPD at baseline and must alert us or the primary physician if symptoms of infection or other concerning signs are noted. There is also a special program designed to monitor depression which is required with Siliq. no

## 2024-04-22 NOTE — LACTATION INITIAL EVALUATION - INTERVENTION OUTCOME
Utilized Guide to Postpartum and [place] [facility] book as a visual reference for mom to better understand teaching points and to utilize at home  to review the education presented during hospitalization. Instructed in hand expression with good return demonstration. + colostrum noted. Encouraged to breastfeed the baby on demand based on cues and at least 8-12 times in a day. Instructed to log feedings along with wet and dirty diapers. Assisted mother with position and latch, newbonr <24 hours old, sleepy at breast. Encouraged to call for assistance as needed./verbalizes understanding/demonstrates understanding of teaching/good return demonstration/needs met/Lactation team to follow up

## 2024-04-23 RX ADMIN — HEPARIN SODIUM 5000 UNIT(S): 5000 INJECTION INTRAVENOUS; SUBCUTANEOUS at 01:26

## 2024-04-23 RX ADMIN — HEPARIN SODIUM 5000 UNIT(S): 5000 INJECTION INTRAVENOUS; SUBCUTANEOUS at 13:16

## 2024-04-23 RX ADMIN — Medication 975 MILLIGRAM(S): at 03:10

## 2024-04-23 RX ADMIN — OXYCODONE HYDROCHLORIDE 5 MILLIGRAM(S): 5 TABLET ORAL at 23:27

## 2024-04-23 RX ADMIN — OXYCODONE HYDROCHLORIDE 5 MILLIGRAM(S): 5 TABLET ORAL at 23:57

## 2024-04-23 RX ADMIN — OXYCODONE HYDROCHLORIDE 5 MILLIGRAM(S): 5 TABLET ORAL at 14:00

## 2024-04-23 RX ADMIN — OXYCODONE HYDROCHLORIDE 5 MILLIGRAM(S): 5 TABLET ORAL at 13:16

## 2024-04-23 RX ADMIN — Medication 600 MILLIGRAM(S): at 05:16

## 2024-04-23 RX ADMIN — MAGNESIUM HYDROXIDE 30 MILLILITER(S): 400 TABLET, CHEWABLE ORAL at 05:20

## 2024-04-23 RX ADMIN — Medication 600 MILLIGRAM(S): at 17:48

## 2024-04-23 RX ADMIN — Medication 975 MILLIGRAM(S): at 02:28

## 2024-04-23 RX ADMIN — Medication 600 MILLIGRAM(S): at 06:00

## 2024-04-23 NOTE — PROGRESS NOTE ADULT - ASSESSMENT
Patient seen at bedside resting comfortably offers no new complaints. not yet ambulating, mantilla just removed no void spontaneously yet.  + flatus;  no bm; tolerating clr liq diet. both breast and bottle feeding. Denies HA, blurry vision or epigastric pain, CP, SOB, N/V/D,  dizziness, palpitations, worsening vaginal bleeding.    Vital Signs Last 24 Hrs  T(C): 36.9 (22 Apr 2024 10:00), Max: 37.8 (21 Apr 2024 19:30)  T(F): 98.4 (22 Apr 2024 10:00), Max: 100 (21 Apr 2024 19:30)  HR: 89 (22 Apr 2024 10:00) (64 - 98)  BP: 90/52 (22 Apr 2024 10:00) (84/58 - 131/81)  BP(mean): 92 (21 Apr 2024 19:30) (63 - 94)  RR: 18 (22 Apr 2024 10:00) (13 - 25)  SpO2: 99% (22 Apr 2024 10:00) (76% - 100%)    Parameters below as of 22 Apr 2024 10:00  Patient On (Oxygen Delivery Method): room air        Gen: A&O x 3, NAD  Chest: CTA B/L  Cardiac: S1,S2  RRR  Breast: Soft, nontender, nonengorged  Abdomen: +BS; soft; Nontender, nondistended; dressing removed incision C/D/I steri strips in place  Gyn: minimal lochia   Extremities: Nontender, venodynes in place                          9.3    14.76 )-----------( 203      ( 22 Apr 2024 05:20 )             28.4       A/P: 28 yr old F POD #1 s/p p/c/s on 4/21/2024 for arrest of decent , PPH (QBL 1324)     #PPH (QBL 1324)   - Starting H/H 13/39 and current H/H 9.3/28.4  - Fe + Vit C supplements on board   - Pt asymptomatic     #PP    - Pain management as needed  - OOB and ambulate  - Incision C/D/I   - Incision care discussed    - Encourage breastfeeding     -d/w Velasquez Bingham NP 
POD2 PCC cb PPH  -meeting all post op goals  -ambulating and voiding, passing some flatus  -encouraged increased ambulation today  -may shower      Acute blood loss anemia  - continue with po iron and vit c  -increase iron rich foods   -repeat CBC in am

## 2024-04-24 VITALS
TEMPERATURE: 98 F | SYSTOLIC BLOOD PRESSURE: 96 MMHG | OXYGEN SATURATION: 99 % | DIASTOLIC BLOOD PRESSURE: 67 MMHG | RESPIRATION RATE: 17 BRPM | HEART RATE: 72 BPM

## 2024-04-24 LAB
HCT VFR BLD CALC: 25.6 % — LOW (ref 34.5–45)
HGB BLD-MCNC: 8.1 G/DL — LOW (ref 11.5–15.5)
MCHC RBC-ENTMCNC: 30.5 PG — SIGNIFICANT CHANGE UP (ref 27–34)
MCHC RBC-ENTMCNC: 31.6 GM/DL — LOW (ref 32–36)
MCV RBC AUTO: 96.2 FL — SIGNIFICANT CHANGE UP (ref 80–100)
NRBC # BLD: 0 /100 WBCS — SIGNIFICANT CHANGE UP (ref 0–0)
NRBC # FLD: 0 K/UL — SIGNIFICANT CHANGE UP (ref 0–0)
PLATELET # BLD AUTO: 245 K/UL — SIGNIFICANT CHANGE UP (ref 150–400)
RBC # BLD: 2.66 M/UL — LOW (ref 3.8–5.2)
RBC # FLD: 13.6 % — SIGNIFICANT CHANGE UP (ref 10.3–14.5)
WBC # BLD: 9.9 K/UL — SIGNIFICANT CHANGE UP (ref 3.8–10.5)
WBC # FLD AUTO: 9.9 K/UL — SIGNIFICANT CHANGE UP (ref 3.8–10.5)

## 2024-04-24 RX ORDER — FERROUS SULFATE 325(65) MG
1 TABLET ORAL
Qty: 0 | Refills: 0 | DISCHARGE
Start: 2024-04-24

## 2024-04-24 RX ORDER — IBUPROFEN 200 MG
1 TABLET ORAL
Qty: 0 | Refills: 0 | DISCHARGE
Start: 2024-04-24

## 2024-04-24 RX ORDER — ACETAMINOPHEN 500 MG
3 TABLET ORAL
Qty: 0 | Refills: 0 | DISCHARGE
Start: 2024-04-24

## 2024-04-24 RX ORDER — ASCORBIC ACID 60 MG
1 TABLET,CHEWABLE ORAL
Qty: 0 | Refills: 0 | DISCHARGE
Start: 2024-04-24

## 2024-04-24 RX ADMIN — Medication 600 MILLIGRAM(S): at 11:10

## 2024-04-24 RX ADMIN — Medication 500 MILLIGRAM(S): at 11:10

## 2024-04-24 RX ADMIN — HEPARIN SODIUM 5000 UNIT(S): 5000 INJECTION INTRAVENOUS; SUBCUTANEOUS at 01:00

## 2024-04-24 RX ADMIN — Medication 600 MILLIGRAM(S): at 05:59

## 2024-04-24 RX ADMIN — Medication 600 MILLIGRAM(S): at 11:45

## 2024-04-24 RX ADMIN — Medication 975 MILLIGRAM(S): at 04:11

## 2024-04-24 RX ADMIN — Medication 600 MILLIGRAM(S): at 06:34

## 2024-04-24 RX ADMIN — HEPARIN SODIUM 5000 UNIT(S): 5000 INJECTION INTRAVENOUS; SUBCUTANEOUS at 11:13

## 2024-04-24 RX ADMIN — Medication 325 MILLIGRAM(S): at 05:59

## 2024-04-24 RX ADMIN — Medication 975 MILLIGRAM(S): at 04:47

## 2024-04-24 NOTE — PROGRESS NOTE ADULT - SUBJECTIVE AND OBJECTIVE BOX
ANESTHESIA POSTOP CHECK    28y Female POSTOP DAY 1     Vital Signs Last 24 Hrs  T(C): 36.7 (22 Apr 2024 05:37), Max: 37.8 (21 Apr 2024 19:30)  T(F): 98.1 (22 Apr 2024 05:37), Max: 100 (21 Apr 2024 19:30)  HR: 87 (22 Apr 2024 05:37) (64 - 99)  BP: 102/69 (22 Apr 2024 05:37) (84/58 - 131/81)  BP(mean): 92 (21 Apr 2024 19:30) (63 - 94)  RR: 18 (22 Apr 2024 05:37) (13 - 25)  SpO2: 100% (22 Apr 2024 05:37) (56% - 100%)    Parameters below as of 21 Apr 2024 21:22  Patient On (Oxygen Delivery Method): room air              [x ] NO APPARENT ANESTHESIA COMPLICATIONS      
INTERVAL HPI/OVERNIGHT EVENTS: Pt seen and examined at bedside.  Adequate PO pain control. +flatus. +voiding without difficulty, +ambulation, rosa reg diet.    MEDICATIONS  (STANDING):  acetaminophen     Tablet .. 975 milliGRAM(s) Oral <User Schedule>  ascorbic acid 500 milliGRAM(s) Oral daily  diphtheria/tetanus/pertussis (acellular) Vaccine (Adacel) 0.5 milliLiter(s) IntraMuscular once  ferrous    sulfate 325 milliGRAM(s) Oral two times a day  heparin   Injectable 5000 Unit(s) SubCutaneous every 12 hours  ibuprofen  Tablet. 600 milliGRAM(s) Oral every 6 hours  influenza   Vaccine 0.5 milliLiter(s) IntraMuscular once    MEDICATIONS  (PRN):  diphenhydrAMINE 25 milliGRAM(s) Oral every 6 hours PRN Pruritus  lanolin Ointment 1 Application(s) Topical every 6 hours PRN Sore Nipples  magnesium hydroxide Suspension 30 milliLiter(s) Oral two times a day PRN Constipation  oxyCODONE    IR 5 milliGRAM(s) Oral once PRN Moderate to Severe Pain (4-10)  oxyCODONE    IR 5 milliGRAM(s) Oral once PRN Moderate to Severe Pain (4-10)  oxyCODONE    IR 5 milliGRAM(s) Oral every 3 hours PRN Moderate to Severe Pain (4-10)  simethicone 80 milliGRAM(s) Chew every 4 hours PRN Gas      Vital Signs Last 24 Hrs  T(C): 37 (23 Apr 2024 06:21), Max: 37 (23 Apr 2024 06:21)  T(F): 98.6 (23 Apr 2024 06:21), Max: 98.6 (23 Apr 2024 06:21)  HR: 80 (23 Apr 2024 06:21) (65 - 89)  BP: 102/68 (23 Apr 2024 06:21) (90/52 - 104/66)  BP(mean): --  RR: 18 (23 Apr 2024 06:21) (18 - 18)  SpO2: 100% (23 Apr 2024 06:21) (99% - 100%)    Parameters below as of 23 Apr 2024 06:21  Patient On (Oxygen Delivery Method): room air        PHYSICAL EXAM:    GA: NAD, A+0 x 3  Abd: ( + ) BS, soft, nontender, nondistended, no rebound or guarding,   Uterus: Fundus midline; firm  Incision: aqualcel dressing in place    LABS:                        9.3    14.76 )-----------( 203      ( 22 Apr 2024 05:20 )             28.4           PT/INR - ( 21 Apr 2024 18:00 )   PT: 11.4 sec;   INR: 1.02 ratio         PTT - ( 21 Apr 2024 18:00 )  PTT:30.6 sec        RADIOLOGY & ADDITIONAL TESTS:  
Patient seen at bedside resting comfortably offers no new complaints. + Ambulation, + void without difficulty, + flatus;  no bm;  tolerating regular diet. both breastfeeding and bottle feeding. Denies HA, blurry vision or epigastric pain, CP, SOB, N/V/D,  dizziness, palpitations, worsening vaginal bleeding.     Vital Signs Last 24 Hrs  T(C): 36.6 (24 Apr 2024 06:32), Max: 36.9 (23 Apr 2024 21:08)  T(F): 97.8 (24 Apr 2024 06:32), Max: 98.4 (23 Apr 2024 21:08)  HR: 74 (24 Apr 2024 06:32) (74 - 87)  BP: 107/80 (24 Apr 2024 06:32) (97/55 - 107/80)  BP(mean): --  RR: 18 (24 Apr 2024 06:32) (18 - 18)  SpO2: 100% (24 Apr 2024 06:32) (99% - 100%)    Parameters below as of 24 Apr 2024 06:32  Patient On (Oxygen Delivery Method): room air        Gen: A&O x 3, NAD  Chest: CTA B/L  Cardiac: S1,S2  RRR  Breast: Soft, nontender, nonengorged  Abdomen: +BS; soft; Nontender, nondistended, Incision C/D/I steri strips in place   Gyn: Minimal lochia  Extremities: Nontender, DTRS 2+, no worsening edema                          8.1    9.90  )-----------( 245      ( 24 Apr 2024 05:25 )             25.6     A/P: 28 yr old F POD #3 s/p p/c/s on 4/21/2024 for arrest of decent , PPH (QBL 1324)     #PPH (QBL 1324)   - Starting H/H 13/39--> 9.3/28.4 --> 8.1/25.6  - Fe + Vit C supplements on board   - Pt asymptomatic     #PP    - Pain management as needed  - OOB and ambulate  - Incision C/D/I   - Incision care discussed    - Encourage breastfeeding   - D/c home   - Instructions verbalized  - F ollow up in 1-2weeks in office for incision check    -d/w dr Trena Bingham NP

## 2024-07-28 ENCOUNTER — NON-APPOINTMENT (OUTPATIENT)
Age: 29
End: 2024-07-28

## 2025-02-14 NOTE — OB RN PATIENT PROFILE - VDRL/RPR: DATE, OB PROFILE
Pt on RA with documented sats. The proper method of use, as well as anticipated side effects, of this metered-dose inhaler are discussed and demonstrated to the patient. Will continue to monitor.    20-Apr-2024

## 2025-04-29 NOTE — DISCHARGE NOTE OB - BREAST MILK IS MORE DIGESTIBLE, MAKING VOMITING, DIARRHEA, GAS AND CONSTIPATION LESS COMMON
Patient called to schedule pain procedure. Patient verbalized understanding of pre-procedural instructions. Patient denies blood thinner and diabetes medication. Educated on need for ride home and to not wear any jewelry or belts.    7AM arrival time    Statement Selected